# Patient Record
Sex: FEMALE | Race: ASIAN | Employment: STUDENT | URBAN - METROPOLITAN AREA
[De-identification: names, ages, dates, MRNs, and addresses within clinical notes are randomized per-mention and may not be internally consistent; named-entity substitution may affect disease eponyms.]

---
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
## 2018-03-20 ENCOUNTER — OFFICE VISIT (OUTPATIENT)
Dept: FAMILY MEDICINE | Facility: CLINIC | Age: 18
End: 2018-03-20
Payer: COMMERCIAL

## 2018-03-20 VITALS
BODY MASS INDEX: 16.73 KG/M2 | HEIGHT: 64 IN | WEIGHT: 98 LBS | DIASTOLIC BLOOD PRESSURE: 75 MMHG | HEART RATE: 76 BPM | SYSTOLIC BLOOD PRESSURE: 117 MMHG

## 2018-03-20 DIAGNOSIS — S06.0X0A CONCUSSION WITHOUT LOSS OF CONSCIOUSNESS, INITIAL ENCOUNTER: Primary | ICD-10-CM

## 2018-03-20 NOTE — MR AVS SNAPSHOT
"              After Visit Summary   3/20/2018    Fred Odom    MRN: [id number]           Patient Information     Date Of Birth          2000        Visit Information        Provider Department      3/20/2018 11:30 AM Radha Gorman MD [facility]        Today's Diagnoses     Concussion without loss of consciousness, initial encounter    -  1       Follow-ups after your visit        Who to contact     Please call your clinic at [phone] to:    Ask questions about your health    Make or cancel appointments    Discuss your medicines    Learn about your test results    Speak to your doctor            Additional Information About Your Visit        MyChart Information     Pixeon is an electronic gateway that provides easy, online access to your medical records. With Pixeon, you can request a clinic appointment, read your test results, renew a prescription or communicate with your care team.     To sign up for Sensiotect visit the website at [email or web address]   You will be asked to enter the access code listed below, as well as some personal information. Please follow the directions to create your username and password.     Your access code is: [email or web address]  Expires: 2018 12:00 PM     Your access code will  in 90 days. If you need help or a new code, please contact your [facility] or call [phone] for assistance.        Care EveryWhere ID     This is your Care EveryWhere ID. This could be used by other organizations to access your [place] medical records  [id number]        Your Vitals Were     Pulse Height Last Period Breastfeeding? BMI (Body Mass Index)       76 1.626 m (5' 4\") 2018 (Approximate) No 16.82 kg/m2        Blood Pressure from Last 3 Encounters:   18 117/75   17 117/79   16 102/68    Weight from Last 3 Encounters:   18 44.5 kg (98 lb) (2 %)*   17 44 kg (97 lb) (2 %)* "   09/21/16 43.1 kg (95 lb) (1 %)*     * Growth percentiles are based on [facility] 2-20 Years data.              Today, you had the following     No orders found for display       Primary Care Provider    None Specified       No primary provider on file.        Equal Access to Services     ROXANNE GAMEZ : Hadii aad ku hadkseniao Sohermesali, wadarinda luqadaha, qamichelleta kaalmada adezaina, shadi lien maria ezohreh bradford keikoagustina lopez . So [facility] [phone].    ATENCIÓN: Si habla español, tiene a lynn disposición servicios gratuitos de asistencia lingüística. Llame al [phone].    We comply with applicable federal civil rights laws and [place] laws. We do not discriminate on the basis of race, color, national origin, age, disability, sex, sexual orientation, or gender identity.            Thank you!     Thank you for choosing [facility]  for your care. Our goal is always to provide you with excellent care. Hearing back from our patients is one way we can continue to improve our services. Please take a few minutes to complete the written survey that you may receive in the mail after your visit with us. Thank you!             Your Updated Medication List - Protect others around you: Learn how to safely use, store and throw away your medicines at www.disposemymeds.org.      Notice  As of 3/20/2018 12:00 PM    You have not been prescribed any medications.

## 2018-03-20 NOTE — PROGRESS NOTES
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 She just presented to her ATC today with her symptoms.  She has also had a cold which is improving so she thought it was just that causing her symptoms.     Mechanism of injury: Dropped by her partner and hit her head on a spring floor.    Immediate Symptoms:  headache, excessive sleepiness, behavior changes, poor concentration, neck pain and blurred vision.  Neck pain has resolved.   Symptoms at this visit:   Headache: Daily HAs worse with exertion. Feeling tired. Hard to focus on homework.  Studying causes HAs.  Using ibuprofen.       SCAT 3 today:  11 symptoms and total score of 20  SAC baseline 28 and today was 25  ZAK:  2 errors today and 3 at baseline    Past pertinent history: Migraines: no     Depression: no     Anxiety: no     Learning disability: no     ADHD: no     Past History of concussions: No    O: NAD    /75  Pulse 76  Ht 1.626 m (5' 4\")  Wt 44.5 kg (98 lb)  LMP 02/21/2018 (Approximate)  Breastfeeding? No  BMI 16.82 kg/m2        EXAM:  GENERAL APPEARANCE: healthy, alert and no distress     NEURO: Normal strength and tone, mentation intact and speech normal  PSYCH:  mentation appears normal and affect normal/bright    HEENT:    NECK:  supple, non-tender, FULL ROM    Neurologic:  TASHIA:Yes  EOMI:Yes  Nystagmus: No  Balance Testing: ZAK testing done, see above.   Painful Eye movements: No  Convergence Testing: tip of nose  Neuro vestibular testing: Head Still eyes move side to side: no nystagmus, no headache, no dizziness and no nausea  Head still eyes move up and down: no nystagmus, no headache, no dizziness and no nausea  Eyes fixed head moves side to side: headache and dizziness  Eyes fixed, head moves up and down: headache and dizziness           ASSESSMENT/PLAN  Concussion with a delayed presentation to the medical staff.    RTL Level " 3    Short periods of studying.  No cheerleading activities.  Ok to try light exercise bike after a couple of days. We discussed in depth what patient should avoid.     RTC in 2 weeks    The patient was managed according to the  Athletic Medicine Concussion Management Plan.      Sabina Hoff ATC was present for the entire appointment.     Saúl Tomlin MD, Sports Medicine Fellow saw and examined the patient as well.     Radha Gorman MD, CAQ, FACSM, CCD  [facility]  Sports Medicine and Bone Health  Team Physician;  Athletics

## 2018-03-20 NOTE — LETTER
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 She just presented to her ATC today with her symptoms.  She has also had a cold which is improving so she thought it was just that causing her symptoms.     Mechanism of injury: Dropped by her partner and hit her head on a spring floor.    Immediate Symptoms:  headache, excessive sleepiness, behavior changes, poor concentration, neck pain and blurred vision.  Neck pain has resolved.   Symptoms at this visit:   Headache: Daily HAs worse with exertion. Feeling tired. Hard to focus on homework.  Studying causes HAs.  Using ibuprofen.       SCAT 3 today:  11 symptoms and total score of 20  SAC baseline 28 and today was 25  ZAK:  2 errors today and 3 at baseline    Past pertinent history: Migraines: no     Depression: no     Anxiety: no     Learning disability: no     ADHD: no     Past History of concussions: No    O: NAD    /75  Pulse 76  Ht 1.626 m (5' 4\")  Wt 44.5 kg (98 lb)  LMP 02/21/2018 (Approximate)  Breastfeeding? No  BMI 16.82 kg/m2        EXAM:  GENERAL APPEARANCE: healthy, alert and no distress     NEURO: Normal strength and tone, mentation intact and speech normal  PSYCH:  mentation appears normal and affect normal/bright    HEENT:    NECK:  supple, non-tender, FULL ROM    Neurologic:  TASHIA:Yes  EOMI:Yes  Nystagmus: No  Balance Testing: ZAK testing done, see above.   Painful Eye movements: No  Convergence Testing: tip of nose  Neuro vestibular testing: Head Still eyes move side to side: no nystagmus, no headache, no dizziness and no nausea  Head still eyes move up and down: no nystagmus, no headache, no dizziness and no nausea  Eyes fixed head moves side to side: headache and dizziness  Eyes fixed, head moves up and down: headache and " dizziness           ASSESSMENT/PLAN  Concussion with a delayed presentation to the medical staff.    RTL Level 3    Short periods of studying.  No cheerleading activities.  Ok to try light exercise bike after a couple of days. We discussed in depth what patient should avoid.     RTC in 2 weeks    The patient was managed according to the  Athletic Medicine Concussion Management Plan.      Sabina Hoff ATC was present for the entire appointment.     Saúl Tomlin MD, Sports Medicine Fellow saw and examined the patient as well.     Radha Gorman MD, CAQ, FACSM, CCD  [facility]  Sports Medicine and Bone Health  Team Physician;  Athletics      Radha Gorman MD

## 2018-03-20 NOTE — LETTER
Date:March 21, 2018      Patient was self referred, no letter generated. Do not send.        [facility] Health Information

## 2018-04-09 ENCOUNTER — OFFICE VISIT (OUTPATIENT)
Dept: FAMILY MEDICINE | Facility: CLINIC | Age: 18
End: 2018-04-09
Payer: COMMERCIAL

## 2018-04-09 VITALS
SYSTOLIC BLOOD PRESSURE: 113 MMHG | DIASTOLIC BLOOD PRESSURE: 78 MMHG | HEIGHT: 64 IN | WEIGHT: 99 LBS | HEART RATE: 101 BPM | BODY MASS INDEX: 16.9 KG/M2

## 2018-04-09 DIAGNOSIS — S06.0X0D CONCUSSION WITHOUT LOSS OF CONSCIOUSNESS, SUBSEQUENT ENCOUNTER: Primary | ICD-10-CM

## 2018-04-09 NOTE — PROGRESS NOTES
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 Delayed presentation for the fist three weeks.   Feels approx 50% better.    Hard to fall asleep but then sleeps normally. Sleeping 7-8 hours per day.   HAs L frontal and posterior.   Sensitive to light.   Dizzy when trying to focus  RTL 3  Comprehension is not as good as normal. She has caught up in class.   Irritated easier.     Past pertinent history: Migraines: no     Depression: no     Anxiety: no     Learning disability: no     ADHD: no     Past History of concussions: No    O: NAD  /78  Pulse 101  Ht 1.626 m (5' 4\")  Wt 44.9 kg (99 lb)  LMP 03/26/2018 (Approximate)  BMI 16.99 kg/m2      EXAM:  GENERAL APPEARANCE: healthy, alert and no distress   Looks brighter.    NEURO: Normal strength and tone, mentation intact and speech normal  PSYCH:  mentation appears normal and affect normal/bright    Neurologic:  TASHIA:Yes  EOMI:Yes  Nystagmus: No  Convergence Testing: tip of nose with repeated trials.   Neuro vestibular testing: Head Still eyes move side to side: no nystagmus, no headache, no dizziness and no nausea  Head still eyes move up and down: no nystagmus, no headache, no dizziness and no nausea  Eyes fixed head moves side to side: mild headache   Eyes fixed, head moves up and down: mild headache    Thumb tracking:  Mild HA and dizziness        ASSESSMENT/PLAN  Concussion with a delayed presentation to the medical staff.    Advance RTL to  Level 4     No cheerleading activities.  Ok to try light exercise bike and see how she feels as it may help her symptoms.     RTC in 2 weeks    The patient was managed according to the  Athletic Medicine Concussion Management Plan.      Sabina Hoff ATC was present for the entire appointment.     Saúl Holley MD, Sports Medicine Fellow saw and examined the patient as well.     Radha Gorman MD, CAQ, " MYRIAM, MIKE  [facility]  Sports Medicine and Bone Health  Team Physician;  Athletics

## 2018-04-09 NOTE — LETTER
Date:April 10, 2018      Patient was self referred, no letter generated. Do not send.        [facility] Health Information

## 2018-04-09 NOTE — LETTER
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 Delayed presentation for the fist three weeks.   Feels approx 50% better.    Hard to fall asleep but then sleeps normally. Sleeping 7-8 hours per day.   HAs L frontal and posterior.   Sensitive to light.   Dizzy when trying to focus  RTL 3  Comprehension is not as good as normal. She has caught up in class.   Irritated easier.     Past pertinent history: Migraines: no     Depression: no     Anxiety: no     Learning disability: no     ADHD: no     Past History of concussions: No    O: NAD  /78  Pulse 101  Ht 1.626 m (5' 4\")  Wt 44.9 kg (99 lb)  LMP 03/26/2018 (Approximate)  BMI 16.99 kg/m2      EXAM:  GENERAL APPEARANCE: healthy, alert and no distress   Looks brighter.    NEURO: Normal strength and tone, mentation intact and speech normal  PSYCH:  mentation appears normal and affect normal/bright    Neurologic:  TASHIA:Yes  EOMI:Yes  Nystagmus: No  Convergence Testing: tip of nose with repeated trials.   Neuro vestibular testing: Head Still eyes move side to side: no nystagmus, no headache, no dizziness and no nausea  Head still eyes move up and down: no nystagmus, no headache, no dizziness and no nausea  Eyes fixed head moves side to side: mild headache   Eyes fixed, head moves up and down: mild headache    Thumb tracking:  Mild HA and dizziness        ASSESSMENT/PLAN  Concussion with a delayed presentation to the medical staff.    Advance RTL to  Level 4     No cheerleading activities.  Ok to try light exercise bike and see how she feels as it may help her symptoms.     RTC in 2 weeks    The patient was managed according to the  Athletic Medicine Concussion Management Plan.      Sabina Hoff ATC was present for the entire appointment.     Saúl Holley MD, Sports Medicine " Fellow saw and examined the patient as well.     Radha Gorman MD, CAQ, FACSM, CCD  [facility]  Sports Medicine and Bone Health  Team Physician;  Athletics    Radha Gorman MD

## 2018-04-09 NOTE — MR AVS SNAPSHOT
"              After Visit Summary   2018    Fred Odom    MRN: [id number]           Patient Information     Date Of Birth          2000        Visit Information        Provider Department      2018 10:30 AM aRdha Gorman MD [facility]        Today's Diagnoses     Concussion without loss of consciousness, subsequent encounter    -  1       Follow-ups after your visit        Who to contact     Please call your clinic at [phone] to:    Ask questions about your health    Make or cancel appointments    Discuss your medicines    Learn about your test results    Speak to your doctor            Additional Information About Your Visit        MyChart Information     FraudMetrix is an electronic gateway that provides easy, online access to your medical records. With FraudMetrix, you can request a clinic appointment, read your test results, renew a prescription or communicate with your care team.     To sign up for Luv Rinkt visit the website at [email or web address]   You will be asked to enter the access code listed below, as well as some personal information. Please follow the directions to create your username and password.     Your access code is: [email or web address]  Expires: 2018 12:00 PM     Your access code will  in 90 days. If you need help or a new code, please contact your [facility] or call [phone] for assistance.        Care EveryWhere ID     This is your Care EveryWhere ID. This could be used by other organizations to access your [place] medical records  [id number]        Your Vitals Were     Pulse Height Last Period BMI (Body Mass Index)          101 1.626 m (5' 4\") 2018 (Approximate) 16.99 kg/m2         Blood Pressure from Last 3 Encounters:   18 113/78   18 117/75   17 117/79    Weight from Last 3 Encounters:   18 44.9 kg (99 lb) (3 %)*   18 44.5 kg (98 lb) (2 %)*   17 44 " kg (97 lb) (2 %)*     * Growth percentiles are based on [facility] 2-20 Years data.              Today, you had the following     No orders found for display       Primary Care Provider    None Specified       No primary provider on file.        Equal Access to Services     UGOBIMAL VERA : Hadii abelino huggins joshuao Zhanna, wadarinda luqadaha, mariota kaalmada pablo, shadi negro robertomary weiner laKamilacheyanne . So [facility] [phone].    ATENCIÓN: Si habla español, tiene a lynn disposición servicios gratuitos de asistencia lingüística. Llame al [phone].    We comply with applicable federal civil rights laws and [place] laws. We do not discriminate on the basis of race, color, national origin, age, disability, sex, sexual orientation, or gender identity.            Thank you!     Thank you for choosing [facility]  for your care. Our goal is always to provide you with excellent care. Hearing back from our patients is one way we can continue to improve our services. Please take a few minutes to complete the written survey that you may receive in the mail after your visit with us. Thank you!             Your Updated Medication List - Protect others around you: Learn how to safely use, store and throw away your medicines at www.disposemymeds.org.      Notice  As of 4/9/2018 11:04 AM    You have not been prescribed any medications.

## 2018-04-23 ENCOUNTER — OFFICE VISIT (OUTPATIENT)
Dept: FAMILY MEDICINE | Facility: CLINIC | Age: 18
End: 2018-04-23
Payer: COMMERCIAL

## 2018-04-23 VITALS
BODY MASS INDEX: 16.9 KG/M2 | HEART RATE: 79 BPM | HEIGHT: 64 IN | SYSTOLIC BLOOD PRESSURE: 97 MMHG | DIASTOLIC BLOOD PRESSURE: 70 MMHG | WEIGHT: 99 LBS

## 2018-04-23 DIAGNOSIS — S06.0X0D CONCUSSION WITHOUT LOSS OF CONSCIOUSNESS, SUBSEQUENT ENCOUNTER: Primary | ICD-10-CM

## 2018-04-23 NOTE — MR AVS SNAPSHOT
"              After Visit Summary   2018    Fred Odom    MRN: [id number]           Patient Information     Date Of Birth          2000        Visit Information        Provider Department      2018 10:45 AM Radha Gorman MD [facility]        Today's Diagnoses     Concussion without loss of consciousness, subsequent encounter    -  1       Follow-ups after your visit        Who to contact     Please call your clinic at [phone] to:    Ask questions about your health    Make or cancel appointments    Discuss your medicines    Learn about your test results    Speak to your doctor            Additional Information About Your Visit        MyChart Information     Central Logic is an electronic gateway that provides easy, online access to your medical records. With Central Logic, you can request a clinic appointment, read your test results, renew a prescription or communicate with your care team.     To sign up for [email or web address] visit the website at [email or web address]   You will be asked to enter the access code listed below, as well as some personal information. Please follow the directions to create your username and password.     Your access code is: [email or web address]  Expires: 2018 12:00 PM     Your access code will  in 90 days. If you need help or a new code, please contact your [facility] or call [phone] for assistance.        Care EveryWhere ID     This is your Care EveryWhere ID. This could be used by other organizations to access your [place] medical records  [id number]        Your Vitals Were     Pulse Height Last Period BMI (Body Mass Index)          79 5' 4\" (1.626 m) 2018 (Approximate) 16.99 kg/m2         Blood Pressure from Last 3 Encounters:   18 97/70   18 113/78   18 117/75    Weight from Last 3 Encounters:   18 99 lb (44.9 kg) (3 %)*   18 99 lb (44.9 kg) (3 %)*   18 98 " lb (44.5 kg) (2 %)*     * Growth percentiles are based on [facility] 2-20 Years data.              Today, you had the following     No orders found for display       Primary Care Provider    None Specified       No primary provider on file.        Equal Access to Services     ROXANNE GAMEZ : Hadii aad ku hadkseniao Sohermesali, wadarinda luqadaha, qamichelleta kaalmada adezaina, shadi mandyin hayaazohreh mejiamary weiner jessica moreno. So [facility] [phone].    ATENCIÓN: Si habla español, tiene a lynn disposición servicios gratuitos de asistencia lingüística. Llame al [phone].    We comply with applicable federal civil rights laws and [place] laws. We do not discriminate on the basis of race, color, national origin, age, disability, sex, sexual orientation, or gender identity.            Thank you!     Thank you for choosing [facility]LET[facility] CLIN[facility]  for your care. Our goal is always to provide you with excellent care. Hearing back from our patients is one way we can continue to improve our services. Please take a few minutes to complete the written survey that you may receive in the mail after your visit with us. Thank you!             Your Updated Medication List - Protect others around you: Learn how to safely use, store and throw away your medicines at www.disposemymeds.org.      Notice  As of 4/23/2018 11:29 AM    You have not been prescribed any medications.

## 2018-04-23 NOTE — LETTER
Date:April 24, 2018      Patient was self referred, no letter generated. Do not send.        [facility] Health Information

## 2018-04-23 NOTE — LETTER
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 Delayed presentation for the fist three weeks.   Feels approx 85% better.    Sleeping well now without any problems.   No HAs but mild dizziness with longer walking or longer studying.   RTL 4  Mood is better.  Dizziness is \"unsteady\" and not vertigo (spinning)    O: NAD  BP 97/70  Pulse 79  Ht 5' 4\" (1.626 m)  Wt 99 lb (44.9 kg)  LMP 03/26/2018 (Approximate)  BMI 16.99 kg/m2      EXAM:  GENERAL APPEARANCE: healthy, alert and no distress   Looks happy   NEURO: Normal strength and tone, mentation intact and speech normal  PSYCH:  mentation appears normal and affect normal/bright    Neurologic:  Eyes fixed head moves side to side: mild headache but less than her last visit. No dizziness  Eyes fixed, head moves up and down: mild headache but less than her last visit.  No dizziness  Thumb tracking:  Mild HA and dizziness but less than last visit.         ASSESSMENT/PLAN  Concussion with a delayed presentation to the medical staff: making good progress  Neurovestibular dysfunction  RTL to  Level 4     .  Ok to try increasing intensity of exercise bike workouts this week.  If that goes well, trial of cheering without jumping or tumbling next week.   RTC in 2 weeks    The patient was managed according to the  Athletic Medicine Concussion Management Plan.      Sabina Hoff ATC was present for the entire appointment.       Radha Gorman MD, CAQ, FACSM, CCD  [facility]  Sports Medicine and Bone Health  Team Physician;  Athletics    Radha Gorman MD    "

## 2018-05-08 ENCOUNTER — OFFICE VISIT (OUTPATIENT)
Dept: FAMILY MEDICINE | Facility: CLINIC | Age: 18
End: 2018-05-08
Payer: COMMERCIAL

## 2018-05-08 VITALS
HEIGHT: 64 IN | BODY MASS INDEX: 17.07 KG/M2 | HEART RATE: 124 BPM | SYSTOLIC BLOOD PRESSURE: 115 MMHG | DIASTOLIC BLOOD PRESSURE: 82 MMHG | WEIGHT: 100 LBS

## 2018-05-08 DIAGNOSIS — S06.0X0D CONCUSSION WITHOUT LOSS OF CONSCIOUSNESS, SUBSEQUENT ENCOUNTER: Primary | ICD-10-CM

## 2018-05-08 NOTE — LETTER
Date:May 9, 2018      Patient was self referred, no letter generated. Do not send.        [facility] Physicians Health Information

## 2018-05-08 NOTE — MR AVS SNAPSHOT
"              After Visit Summary   2018    Fred Odom    MRN: [id number]           Patient Information     Date Of Birth          2000        Visit Information        Provider Department      2018 2:15 PM Radha Gorman MD [facility]        Today's Diagnoses     Concussion without loss of consciousness, subsequent encounter    -  1       Follow-ups after your visit        Who to contact     Please call your clinic at [phone] to:    Ask questions about your health    Make or cancel appointments    Discuss your medicines    Learn about your test results    Speak to your doctor            Additional Information About Your Visit        MyChart Information     mcTEL is an electronic gateway that provides easy, online access to your medical records. With mcTEL, you can request a clinic appointment, read your test results, renew a prescription or communicate with your care team.     To sign up for mcTEL visit the website at [email or web address]   You will be asked to enter the access code listed below, as well as some personal information. Please follow the directions to create your username and password.     Your access code is: [email or web address]  Expires: 2018 12:00 PM     Your access code will  in 90 days. If you need help or a new code, please contact your [facility] or call [phone] for assistance.        Care EveryWhere ID     This is your Care EveryWhere ID. This could be used by other organizations to access your [place] medical records  [id number]        Your Vitals Were     Pulse Height Last Period BMI (Body Mass Index)          124 1.626 m (5' 4\") 2018 (Exact Date) 17.16 kg/m2         Blood Pressure from Last 3 Encounters:   18 115/82   18 97/70   18 113/78    Weight from Last 3 Encounters:   18 45.4 kg (100 lb)   18 44.9 kg (99 lb) (3 %)*   18 44.9 kg (99 " lb) (3 %)*     * Growth percentiles are based on [facility] 2-20 Years data.              Today, you had the following     No orders found for display       Primary Care Provider    None Specified       No primary provider on file.        Equal Access to Services     ROXANNE GAMEZ : Hadii aad ku hadkseniatico Shawandahermesali, eli karinfunmilayo, mahesh kaevie shah, shadi sandersonzohreh mejiamary adenagustina moreno. So [facility] [phone].    ATENCIÓN: Si habla español, tiene a lnyn disposición servicios gratuitos de asistencia lingüística. Llame al [phone].    We comply with applicable federal civil rights laws and [place] laws. We do not discriminate on the basis of race, color, national origin, age, disability, sex, sexual orientation, or gender identity.            Thank you!     Thank you for choosing [facility]TH[facility]  for your care. Our goal is always to provide you with excellent care. Hearing back from our patients is one way we can continue to improve our services. Please take a few minutes to complete the written survey that you may receive in the mail after your visit with us. Thank you!             Your Updated Medication List - Protect others around you: Learn how to safely use, store and throw away your medicines at www.disposemymeds.org.      Notice  As of 5/8/2018  3:06 PM    You have not been prescribed any medications.

## 2018-05-08 NOTE — PROGRESS NOTES
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 Delayed presentation for the fist three weeks.   Feels approx 95% better.    Sleeping well without compliants  HA with some unsteadiness at times with prolonged studying and working out.   RTL 4  Mood: good  Dizziness is \"unsteady\" and not vertigo (spinning)    O: NAD  /82  Pulse 124  Ht 1.626 m (5' 4\")  Wt 45.4 kg (100 lb)  LMP 04/23/2018 (Exact Date)  BMI 17.16 kg/m2      EXAM:  GENERAL APPEARANCE: healthy, alert and no distress   Looks happy   NEURO: Normal strength and tone, mentation intact and speech normal  PSYCH:  mentation appears normal and affect normal/bright    Neurologic:  Thumb tracking:  Minor frontal HA and dizziness but less than last visit.         ASSESSMENT/PLAN  Concussion with a delayed presentation to the medical staff: making good progress  Neurovestibular dysfunction  RTL to  Level 5 once her last final is done.      Ok to try increasing intensity of exercise bike workouts and adding light jogging early next week. If that goes well, trial of cheering without jumping or tumbling could be the next step.  RTC on May 17 since they have practice that day for a week.  The next one week practice will be in late June.        The patient was managed according to the  Athletic Medicine Concussion Management Plan.      Sabina oHff ATC was present for the entire appointment.       Radha Gorman MD, CAQ, FACSM, CCD  [facility]  Sports Medicine and Bone Health  Team Physician;  Athletics  "

## 2018-05-08 NOTE — LETTER
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 Delayed presentation for the fist three weeks.   Feels approx 95% better.    Sleeping well without compliants  HA with some unsteadiness at times with prolonged studying and working out.   RTL 4  Mood: good  Dizziness is \"unsteady\" and not vertigo (spinning)    O: NAD  /82  Pulse 124  Ht 1.626 m (5' 4\")  Wt 45.4 kg (100 lb)  LMP 04/23/2018 (Exact Date)  BMI 17.16 kg/m2      EXAM:  GENERAL APPEARANCE: healthy, alert and no distress   Looks happy   NEURO: Normal strength and tone, mentation intact and speech normal  PSYCH:  mentation appears normal and affect normal/bright    Neurologic:  Thumb tracking:  Minor frontal HA and dizziness but less than last visit.         ASSESSMENT/PLAN  Concussion with a delayed presentation to the medical staff: making good progress  Neurovestibular dysfunction  RTL to  Level 5 once her last final is done.      Ok to try increasing intensity of exercise bike workouts and adding light jogging early next week. If that goes well, trial of cheering without jumping or tumbling could be the next step.  RTC on May 17 since they have practice that day for a week.  The next one week practice will be in late June.        The patient was managed according to the  Athletic Medicine Concussion Management Plan.      Sabina Hoff ATC was present for the entire appointment.       Radha Gorman MD, CAQ, FACSM, CCD  [facility]  Sports Medicine and Bone Health  Team Physician;  Athletics    Radha Gorman MD    "

## 2018-05-17 ENCOUNTER — OFFICE VISIT (OUTPATIENT)
Dept: FAMILY MEDICINE | Facility: CLINIC | Age: 18
End: 2018-05-17
Payer: COMMERCIAL

## 2018-05-17 VITALS
WEIGHT: 99 LBS | HEIGHT: 64 IN | SYSTOLIC BLOOD PRESSURE: 129 MMHG | DIASTOLIC BLOOD PRESSURE: 82 MMHG | BODY MASS INDEX: 16.9 KG/M2 | HEART RATE: 121 BPM

## 2018-05-17 DIAGNOSIS — S06.0X0D CONCUSSION WITHOUT LOSS OF CONSCIOUSNESS, SUBSEQUENT ENCOUNTER: Primary | ICD-10-CM

## 2018-05-17 NOTE — LETTER
Date:June 8, 2018      Patient was self referred, no letter generated. Do not send.        [facility] Physicians Health Information

## 2018-05-17 NOTE — PROGRESS NOTES
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 Delayed presentation for the first three weeks. States she continues to improve and is currently asymptomatic.  Currently RTL level 5. Dizziness and headaches have improved. However, feels spinning or tumbling has potential to elicit these symptoms.  Has been increasing activity, is currently planning on doing a trial of practice today as previously discussed at last clinic visit.     O: NAD  /82  Pulse 124  Ht 1.626 m (5' 4\")  Wt 45.4 kg (100 lb)  LMP 04/23/2018 (Exact Date)  BMI 17.16 kg/m2        EXAM:  GENERAL APPEARANCE: healthy, alert and no distress   Looks happy   NEURO: Normal strength and tone, mentation intact and speech normal  PSYCH:  mentation appears normal and affect normal/bright     Neurologic:  Convergence: Tip of nose  Thumb tracking: asymptomatic (improved from prior exam)                               ASSESSMENT/PLAN  Concussion with a delayed presentation to the medical staff  Overall improving  Neurovestibular dysfunction  RTL level 5  Discussed she is ok to do trial of cheer without tumbling, flying, or spins.   May continue to progress activity with elliptical today  If tolerates elliptical, will progress to light jogging  If recurrence of symptoms, counseled at length to report to Sabina MERIDA  Recommend RTC in 1-2 weeks to discuss final clearance if continued improvement.  Patient and Sabina Gibson endorsed understanding and agreement with the above plan    Patient seen and discussed with MD Edu Morillo MD  Primary Care Sports Medicine Fellow  "

## 2018-05-17 NOTE — LETTER
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 Delayed presentation for the first three weeks. States she continues to improve and is currently asymptomatic.  Currently RTL level 5. Dizziness and headaches have improved. However, feels spinning or tumbling has potential to elicit these symptoms.  Has been increasing activity, is currently planning on doing a trial of practice today as previously discussed at last clinic visit.     O: NAD  /82  Pulse 124  Ht 1.626 m (5' 4\")  Wt 45.4 kg (100 lb)  LMP 04/23/2018 (Exact Date)  BMI 17.16 kg/m2        EXAM:  GENERAL APPEARANCE: healthy, alert and no distress   Looks happy   NEURO: Normal strength and tone, mentation intact and speech normal  PSYCH:  mentation appears normal and affect normal/bright     Neurologic:  Convergence: Tip of nose  Thumb tracking: asymptomatic (improved from prior exam)                               ASSESSMENT/PLAN  Concussion with a delayed presentation to the medical staff  Overall improving  Neurovestibular dysfunction  RTL level 5  Discussed she is ok to do trial of cheer without tumbling, flying, or spins.   May continue to progress activity with elliptical today  If tolerates elliptical, will progress to light jogging  If recurrence of symptoms, counseled at length to report to Sabina MERIDA  Recommend RTC in 1-2 weeks to discuss final clearance if continued improvement.  Patient and cheer Sabina MERIDA endorsed understanding and agreement with the above plan    Patient seen and discussed with MD Edu Morillo MD  Primary Care Sports Medicine Fellow    During today's visit, I was present in the room to review the history and the pertinent parts of the exam. I agree with the above assessment and plan as documented by the fellow.  Supervising physician: Scar LEAL" Nithya  [facility]      Scar Barriga MD

## 2018-05-17 NOTE — MR AVS SNAPSHOT
"              After Visit Summary   2018    Fred Odom    MRN: [id number]           Patient Information     Date Of Birth          2000        Visit Information        Provider Department      2018 11:45 AM Scar Barriga MD [facility]        Today's Diagnoses     Concussion without loss of consciousness, subsequent encounter    -  1       Follow-ups after your visit        Who to contact     Please call your clinic at [phone] to:    Ask questions about your health    Make or cancel appointments    Discuss your medicines    Learn about your test results    Speak to your doctor            Additional Information About Your Visit        MyChart Information     [employer] is an electronic gateway that provides easy, online access to your medical records. With [employer], you can request a clinic appointment, read your test results, renew a prescription or communicate with your care team.     To sign up for [employer] visit the website at [email or web address]   You will be asked to enter the access code listed below, as well as some personal information. Please follow the directions to create your username and password.     Your access code is: [email or web address]  Expires: 2018 12:00 PM     Your access code will  in 90 days. If you need help or a new code, please contact your [facility] or call [phone] for assistance.        Care EveryWhere ID     This is your Care EveryWhere ID. This could be used by other organizations to access your [place] medical records  [id number]        Your Vitals Were     Pulse Height Last Period BMI (Body Mass Index)          121 1.626 m (5' 4\") 2018 (Exact Date) 16.99 kg/m2         Blood Pressure from Last 3 Encounters:   18 129/82   18 115/82   18 97/70    Weight from Last 3 Encounters:   18 44.9 kg (99 lb)   18 45.4 kg (100 lb)   18 44.9 kg (99 lb) (3 %)*     * " Growth percentiles are based on [facility] 2-20 Years data.              Today, you had the following     No orders found for display       Primary Care Provider    None Specified       No primary provider on file.        Equal Access to Services     ROXANNE GAMEZ : Guillaume Chao, eli guerra, traceygigi stilesconcepciondeena mejiazaina, shadi mandyin hayaazohreh mejiamary weiner jessica moreno. So [facility] [phone].    ATENCIÓN: Si habla español, tiene a lynn disposición servicios gratuitos de asistencia lingüística. Llame al [phone].    We comply with applicable federal civil rights laws and [place] laws. We do not discriminate on the basis of race, color, national origin, age, disability, sex, sexual orientation, or gender identity.            Thank you!     Thank you for choosing [facility] ATHLETIC [facility]  for your care. Our goal is always to provide you with excellent care. Hearing back from our patients is one way we can continue to improve our services. Please take a few minutes to complete the written survey that you may receive in the mail after your visit with us. Thank you!             Your Updated Medication List - Protect others around you: Learn how to safely use, store and throw away your medicines at www.disposemymeds.org.      Notice  As of 5/17/2018 11:59 PM    You have not been prescribed any medications.

## 2018-06-12 ENCOUNTER — OFFICE VISIT (OUTPATIENT)
Dept: FAMILY MEDICINE | Facility: CLINIC | Age: 18
End: 2018-06-12
Payer: COMMERCIAL

## 2018-06-12 DIAGNOSIS — S06.0X0D CONCUSSION WITHOUT LOSS OF CONSCIOUSNESS, SUBSEQUENT ENCOUNTER: Primary | ICD-10-CM

## 2018-06-12 NOTE — PROGRESS NOTES
Attending Note:   I have personally examined this patient and have reviewed the clinical presentation and progress note with the fellow. I agree with the treatment plan as outlined. The plan was formulated with the fellow on the day of the patient's visit.   Radha Gorman MD, CAQ, CCD  [facility]  Sports Medicine and Bone Health

## 2018-06-12 NOTE — PROGRESS NOTES
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 Delayed presentation for the first three weeks. States she continues to improve.  Currently RTL level 5. Dizziness and headaches have resolved essentially. Slightly dizzy with a mild frontal HA with inverted positions. Dizzy during inverted positions some times. Not doing back handsprings. Some HA increase with running but resolves later.  Running and doing non-tumbling cheering without symptoms. Started inverted position about two weeks ago.     O: NAD  /82  Pulse 124  Ht 1.626 m (5' 4\")  Wt 45.4 kg (100 lb)  LMP 04/23/2018 (Exact Date)  BMI 17.16 kg/m2        EXAM:  GENERAL APPEARANCE: healthy, alert and no distress   Looks happy   NEURO: Normal strength and tone, mentation intact and speech normal  PSYCH:  mentation appears normal and affect normal/bright     ASSESSMENT/PLAN  Concussion with a delayed presentation to the medical staff  Overall improving  Neurovestibular dysfunction  RTL level 5  Discussed she is ok to do trial of cheer without tumbling, flying, or spins.  Do not advance yet.    Recommend RTC in 1-2 weeks to discuss advancing to back handsprings, tucks and stunting.   Patient and chejoy ATAsif endorsed understanding and agreement with the above plan    Patient seen and discussed with Dr. Sherif Tomlin MD  Primary Care Sports Medicine Fellow  "

## 2018-06-12 NOTE — LETTER
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 Delayed presentation for the first three weeks. States she continues to improve.  Currently RTL level 5. Dizziness and headaches have resolved essentially. Slightly dizzy with a mild frontal HA with inverted positions. Dizzy during inverted positions some times. Not doing back handsprings. Some HA increase with running but resolves later.  Running and doing non-tumbling cheering without symptoms. Started inverted position about two weeks ago.     O: NAD  /82  Pulse 124  Ht 1.626 m (5' 4\")  Wt 45.4 kg (100 lb)  LMP 04/23/2018 (Exact Date)  BMI 17.16 kg/m2        EXAM:  GENERAL APPEARANCE: healthy, alert and no distress   Looks happy   NEURO: Normal strength and tone, mentation intact and speech normal  PSYCH:  mentation appears normal and affect normal/bright     ASSESSMENT/PLAN  Concussion with a delayed presentation to the medical staff  Overall improving  Neurovestibular dysfunction  RTL level 5  Discussed she is ok to do trial of cheer without tumbling, flying, or spins.  Do not advance yet.    Recommend RTC in 1-2 weeks to discuss advancing to back handsprings, tucks and stunting.   Patient and cheAsif Herrera endorsed understanding and agreement with the above plan    Patient seen and discussed with Dr. Sherif Tomlin MD  Primary Care Sports Medicine Fellow    Attending Note:   I have personally examined this patient and have reviewed the clinical presentation and progress note with the fellow. I agree with the treatment plan as outlined. The plan was formulated with the fellow on the day of the patient's visit.   Radha Gorman MD, CAQ, CCD  [facility]  Sports Medicine and Bone Health    Radha Gorman MD    "

## 2018-06-12 NOTE — MR AVS SNAPSHOT
After Visit Summary   2018    Fred Odom    MRN: [id number]           Patient Information     Date Of Birth          2000        Visit Information        Provider Department      2018 9:30 AM Radha Gorman MD [facility]        Today's Diagnoses     Concussion without loss of consciousness, subsequent encounter    -  1       Follow-ups after your visit        Who to contact     Please call your clinic at [phone] to:    Ask questions about your health    Make or cancel appointments    Discuss your medicines    Learn about your test results    Speak to your doctor            Additional Information About Your Visit        MyChart Information     [employer] is an electronic gateway that provides easy, online access to your medical records. With [employer], you can request a clinic appointment, read your test results, renew a prescription or communicate with your care team.     To sign up for [employer] visit the website at [email or web address]   You will be asked to enter the access code listed below, as well as some personal information. Please follow the directions to create your username and password.     Your access code is: [email or web address]  Expires: 2018 12:00 PM     Your access code will  in 90 days. If you need help or a new code, please contact your [facility] or call [phone] for assistance.        Care EveryWhere ID     This is your Care EveryWhere ID. This could be used by other organizations to access your [place] medical records  [id number]         Blood Pressure from Last 3 Encounters:   18 129/82   18 115/82   18 97/70    Weight from Last 3 Encounters:   18 44.9 kg (99 lb)   18 45.4 kg (100 lb)   18 44.9 kg (99 lb) (3 %)*     * Growth percentiles are based on CDC 2-20 Years data.              Today, you had the following     No orders found for display        Primary Care Provider    None Specified       No primary provider on file.        Equal Access to Services     ROXANNE GAMEZ : Hadii abelino Chao, eli guerra, shadi smith. So [facility] [phone].    ATENCIÓN: Si habla español, tiene a lynn disposición servicios gratuitos de asistencia lingüística. Llame al [phone].    We comply with applicable federal civil rights laws and [place] laws. We do not discriminate on the basis of race, color, national origin, age, disability, sex, sexual orientation, or gender identity.            Thank you!     Thank you for choosing [facility]  for your care. Our goal is always to provide you with excellent care. Hearing back from our patients is one way we can continue to improve our services. Please take a few minutes to complete the written survey that you may receive in the mail after your visit with us. Thank you!             Your Updated Medication List - Protect others around you: Learn how to safely use, store and throw away your medicines at www.disposemymeds.org.      Notice  As of 6/12/2018  9:54 AM    You have not been prescribed any medications.

## 2018-06-12 NOTE — LETTER
Date:June 13, 2018      Patient was self referred, no letter generated. Do not send.        [facility] Physicians Health Information

## 2018-07-09 ENCOUNTER — OFFICE VISIT (OUTPATIENT)
Dept: FAMILY MEDICINE | Facility: CLINIC | Age: 18
End: 2018-07-09
Payer: COMMERCIAL

## 2018-07-09 VITALS
DIASTOLIC BLOOD PRESSURE: 73 MMHG | SYSTOLIC BLOOD PRESSURE: 110 MMHG | HEART RATE: 84 BPM | OXYGEN SATURATION: 99 % | BODY MASS INDEX: 18.88 KG/M2 | HEIGHT: 61 IN | WEIGHT: 100 LBS

## 2018-07-09 DIAGNOSIS — S06.0X0D CONCUSSION WITHOUT LOSS OF CONSCIOUSNESS, SUBSEQUENT ENCOUNTER: Primary | ICD-10-CM

## 2018-07-09 NOTE — PROGRESS NOTES
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  She had advanced to round offs, cartwheels, handstands and stunting and was doing well until...  -Sunday, June 24th was dropped while stunting. Landed on butt.  Did not hit head.  Head may have gotten banged while they were trying to catch her.  Headache, dizziness and neck pain return.  Same symptoms as previously experienced.  Symptoms improved a lot by JUne 28.  Symptoms continued to improved.  Last symptoms were July 1 or 2.  Resting. No exercise at all.  No classes this summer.  Sleeping:  Going well without symptoms.  More sleepy in the beginning.    SCAT3:  Was done last week Friday and she had symptoms but did well on the cognitive       O: NAD  /73  Pulse 84  Ht 1.549 m (5' 1\")  Wt 45.4 kg (100 lb)  LMP 06/21/2018  SpO2 99%  Breastfeeding? No  BMI 18.89 kg/m2      Neck;  NTTP  VOMS:    NPC:  tip of nose  Saccades:  Horizontal and Vertical: mild dizzy  VOR:  No symptoms  Thumb tracking; normal      Balance:  Single leg without and with eyes open and closed.   Rhomberg;  negative      FTN:  Negative    A:  Concussion  RTL Level 5:  but not in classes this summer.   Ok to start RTP progression.  RTC in one week    The patient was managed according to the  Athletic Medicine Concussion Management Protocol.        Monica Ch ATC was present for the entire appointment.        Brian Rivera MD, was present for the entire appt and examined the patient.        Radha Gorman MD, CAQ, FACSM, CCD  [facility]  Sports Medicine and Bone Health  Team Physician;  Athletics    "

## 2018-07-09 NOTE — LETTER
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  She had advanced to round offs, cartwheels, handstands and stunting and was doing well until...  -Sunday, June 24th was dropped while stunting. Landed on butt.  Did not hit head.  Head may have gotten banged while they were trying to catch her.  Headache, dizziness and neck pain return.  Same symptoms as previously experienced.  Symptoms improved a lot by JUne 28.  Symptoms continued to improved.  Last symptoms were July 1 or 2.  Resting. No exercise at all.  No classes this summer.  Sleeping:  Going well without symptoms.  More sleepy in the beginning.    SCAT3:  Was done last week Friday and she had symptoms but did well on the cognitive       O: NAD  /73  Pulse 84  Ht 1.549 m (5' 1\")  Wt 45.4 kg (100 lb)  LMP 06/21/2018  SpO2 99%  Breastfeeding? No  BMI 18.89 kg/m2      Neck;  NTTP  VOMS:    NPC:  tip of nose  Saccades:  Horizontal and Vertical: mild dizzy  VOR:  No symptoms  Thumb tracking; normal      Balance:  Single leg without and with eyes open and closed.   Rhomberg;  negative      FTN:  Negative    A:  Concussion  RTL Level 5:  but not in classes this summer.   Ok to start RTP progression.  RTC in one week    The patient was managed according to the  Athletic Medicine Concussion Management Protocol.        Monica Ch ATC was present for the entire appointment.        Brian Rivera MD, was present for the entire appt and examined the patient.        Radha Gorman MD, CAQ, FACSM, CCD  [facility]  Sports Medicine and Bone Health  Team Physician;  Athletics      Radha Gorman MD    "

## 2018-07-09 NOTE — LETTER
Date:July 10, 2018      Patient was self referred, no letter generated. Do not send.        [facility] Health Information

## 2018-07-09 NOTE — MR AVS SNAPSHOT
"              After Visit Summary   7/9/2018    Fred Odom    MRN: [id number]           Patient Information     Date Of Birth          2000        Visit Information        Provider Department      7/9/2018 9:45 AM Radha Gorman MD [facility]        Today's Diagnoses     Concussion without loss of consciousness, subsequent encounter    -  1       Follow-ups after your visit        Who to contact     Please call your clinic at [phone] to:    Ask questions about your health    Make or cancel appointments    Discuss your medicines    Learn about your test results    Speak to your doctor            Additional Information About Your Visit        Care EveryWhere ID     This is your Care EveryWhere ID. This could be used by other organizations to access your [place] medical records  [id number]        Your Vitals Were     Pulse Height Last Period Pulse Oximetry Breastfeeding? BMI (Body Mass Index)    84 1.549 m (5' 1\") 06/21/2018 99% No 18.89 kg/m2       Blood Pressure from Last 3 Encounters:   07/09/18 110/73   05/17/18 129/82   05/08/18 115/82    Weight from Last 3 Encounters:   07/09/18 45.4 kg (100 lb)   05/17/18 44.9 kg (99 lb)   05/08/18 45.4 kg (100 lb)              Today, you had the following     No orders found for display       Primary Care Provider    None Specified       No primary provider on file.        Equal Access to Services     ROXANNE GAMEZ : Hadii abelino lewiso Sodanielle, waaxda luqadaha, qaybta kaalmada adeegyada, shadi lopez . So [facility] [phone].    ATENCIÓN: Si habla español, tiene a lynn disposición servicios gratuitos de asistencia lingüística. Llame al [phone].    We comply with applicable federal civil rights laws and [place] laws. We do not discriminate on the basis of race, color, national origin, age, disability, sex, sexual orientation, or gender identity.            Thank you!     Thank you for choosing " [facility]  for your care. Our goal is always to provide you with excellent care. Hearing back from our patients is one way we can continue to improve our services. Please take a few minutes to complete the written survey that you may receive in the mail after your visit with us. Thank you!             Your Updated Medication List - Protect others around you: Learn how to safely use, store and throw away your medicines at www.disposemymeds.org.      Notice  As of 7/9/2018 10:31 AM    You have not been prescribed any medications.

## 2018-07-31 ENCOUNTER — OFFICE VISIT (OUTPATIENT)
Dept: FAMILY MEDICINE | Facility: CLINIC | Age: 18
End: 2018-07-31
Payer: COMMERCIAL

## 2018-07-31 VITALS — SYSTOLIC BLOOD PRESSURE: 108 MMHG | DIASTOLIC BLOOD PRESSURE: 78 MMHG | HEART RATE: 79 BPM

## 2018-07-31 DIAGNOSIS — S06.0X0D CONCUSSION WITHOUT LOSS OF CONSCIOUSNESS, SUBSEQUENT ENCOUNTER: Primary | ICD-10-CM

## 2018-07-31 NOTE — MR AVS SNAPSHOT
After Visit Summary   7/31/2018    Fred Odom    MRN: [id number]           Patient Information     Date Of Birth          2000        Visit Information        Provider Department      7/31/2018 9:15 AM Radha Gorman MD [facility]        Today's Diagnoses     Concussion without loss of consciousness, subsequent encounter    -  1       Follow-ups after your visit        Who to contact     Please call your clinic at [phone] to:    Ask questions about your health    Make or cancel appointments    Discuss your medicines    Learn about your test results    Speak to your doctor            Additional Information About Your Visit        Care EveryWhere ID     This is your Care EveryWhere ID. This could be used by other organizations to access your [employer] medical records  [id number]        Your Vitals Were     Pulse Last Period                79 07/25/2018 (Approximate)           Blood Pressure from Last 3 Encounters:   08/21/18 105/76   07/31/18 108/78   07/09/18 110/73    Weight from Last 3 Encounters:   08/21/18 45.7 kg (100 lb 12.8 oz)   07/09/18 45.4 kg (100 lb)   05/17/18 44.9 kg (99 lb)              Today, you had the following     No orders found for display       Primary Care Provider    None Specified       No primary provider on file.        Equal Access to Services     ROXANNE GAMEZ : Guillaume Chao, eli guerra, mahesh shah, shadi moreno. So [facility] [phone].    ATENCIÓN: Si habla español, tiene a lynn disposición servicios gratuitos de asistencia lingüística. Llame al [phone].    We comply with applicable federal civil rights laws and [place] laws. We do not discriminate on the basis of race, color, national origin, age, disability, sex, sexual orientation, or gender identity.            Thank you!     Thank you for choosing [facility]  for your care. Our  goal is always to provide you with excellent care. Hearing back from our patients is one way we can continue to improve our services. Please take a few minutes to complete the written survey that you may receive in the mail after your visit with us. Thank you!             Your Updated Medication List - Protect others around you: Learn how to safely use, store and throw away your medicines at www.disposemymeds.org.      Notice  As of 7/31/2018 11:59 PM    You have not been prescribed any medications.

## 2018-07-31 NOTE — LETTER
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  She was about to be fully cleared when she sustained another concussion on June 24. She tried light exercise bike and had symptoms so did not want to continue that.  She has seen Dr. Sarthak Pickard and was found to need a new prescription and eye rehab due to constricted visual fields and convergence fatigue.  She is overall feeling better and is having some symptom free days now. Sleeping well.  HAs are decreasing in frequency and intensity.       -O: NAD  /78  Pulse 79        VOMS:    NPC:  tip of nose  Saccades:  Normal without symptoms  VOR:Horizontal and Vertical: No symptoms   Thumb tracking; normal         A:  Concussion  Neurovestibular dysfunction  RTL Level 5:  but not in classes this summer.   Ok for more biking  and harder if tolerating well. .  Continue with Dr. Sarthak Pickard, Behavioral Optometrist.   RTC in approx 3-4 weeks  The patient was managed according to the  Athletic Medicine Concussion Management Protocol.        Monica Ch ATC was present for the entire appointment.            Radha Gorman MD, CAQ, FACSM, CCD  [facility]  Sports Medicine and Bone Health  Team Physician;  Athletics      Radha Gorman MD

## 2018-07-31 NOTE — LETTER
Date:August 29, 2018      Patient was self referred, no letter generated. Do not send.        [facility] Health Information

## 2018-08-01 NOTE — PROGRESS NOTES
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  She was about to be fully cleared when she sustained another concussion on June 24. She tried light exercise bike and had symptoms so did not want to continue that.  She has seen Dr. Sarthak Pickard and was found to need a new prescription and eye rehab due to constricted visual fields and convergence fatigue.  She is overall feeling better and is having some symptom free days now. Sleeping well.  HAs are decreasing in frequency and intensity.       -O: NAD  /78  Pulse 79        VOMS:    NPC:  tip of nose  Saccades:  Normal without symptoms  VOR:Horizontal and Vertical: No symptoms   Thumb tracking; normal         A:  Concussion  Neurovestibular dysfunction  RTL Level 5:  but not in classes this summer.   Ok for more biking  and harder if tolerating well. .  Continue with Dr. Sarthak Pickard, Behavioral Optometrist.   RTC in approx 3-4 weeks  The patient was managed according to the  Athletic Medicine Concussion Management Protocol.        Monica Ch ATC was present for the entire appointment.            Radha Gorman MD, CAQ, FACSM, CCD  [facility]  Sports Medicine and Bone Health  Team Physician;  Athletics

## 2018-08-21 ENCOUNTER — OFFICE VISIT (OUTPATIENT)
Dept: FAMILY MEDICINE | Facility: CLINIC | Age: 18
End: 2018-08-21
Payer: COMMERCIAL

## 2018-08-21 VITALS
SYSTOLIC BLOOD PRESSURE: 105 MMHG | BODY MASS INDEX: 17.21 KG/M2 | HEART RATE: 91 BPM | WEIGHT: 100.8 LBS | DIASTOLIC BLOOD PRESSURE: 76 MMHG | HEIGHT: 64 IN

## 2018-08-21 DIAGNOSIS — S06.0X0D CONCUSSION WITHOUT LOSS OF CONSCIOUSNESS, SUBSEQUENT ENCOUNTER: Primary | ICD-10-CM

## 2018-08-21 NOTE — LETTER
Date:September 4, 2018      Patient was self referred, no letter generated. Do not send.        [facility] Health Information

## 2018-08-21 NOTE — LETTER
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     She saw Sarthak Pickard 1 week ago who gave her eye exercises and new lenses. She has been using these techniques and has noted benefit. She goes back for follow up next week.     In the past week she has been feeling about 90% better with the exception of this past Friday. 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 She reports feeling very dizzy and nauseous throughout the day. She rested Saturday and Sunday and has been feeling much better. She did bike for 8 minutes yesterday which was tolerated without issue.     O: NAD  Ht 1.626 m (5' 4\")  Wt 45.7 kg (100 lb 12.8 oz)  LMP 07/25/2018 (Approximate)  BMI 17.3 kg/m2   VOMs  -VOR (horizontal and vertical) and VMS testing did not provoke symptoms.     A:  Concussion with Neurovestibular dysfunction    -May increase physical activity. Take return to play slower (2 days at activity before advancing, instead of 1)  -F/u with Dr. Sarthak Pickard, Behavioral Optometrist as scheduled next week  -The patient was managed according to the  Athletic Medicine Concussion Management Protocol.      Dr. Gorman and Chacha Houston ATC were present for the entire appointment.      Marilu Muhammad MD  Primary Care Sports Medicine Fellow              Attending Note:   I have personally examined this patient and have reviewed the clinical presentation and progress note with the fellow. I agree with the treatment plan as outlined. The plan was formulated with the fellow on the day of the patient's visit.    Radha Gorman MD, CAQ, CCD  [facility]  Sports Medicine and Bone Health      Radha Gorman MD    "

## 2018-08-21 NOTE — PROGRESS NOTES
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     She saw Sarthka Pickard 1 week ago who gave her eye exercises and new lenses. She has been using these techniques and has noted benefit. She goes back for follow up next week.     In the past week she has been feeling about 90% better with the exception of this past Friday. 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 She reports feeling very dizzy and nauseous throughout the day. She rested Saturday and Sunday and has been feeling much better. She did bike for 8 minutes yesterday which was tolerated without issue.     O: NAD  Ht 1.626 m (5' 4\")  Wt 45.7 kg (100 lb 12.8 oz)  LMP 07/25/2018 (Approximate)  BMI 17.3 kg/m2   VOMs  -VOR (horizontal and vertical) and VMS testing did not provoke symptoms.     A:  Concussion with Neurovestibular dysfunction    -May increase physical activity. Take return to play slower (2 days at activity before advancing, instead of 1)  -F/u with Dr. Sarthak Pickard, Behavioral Optometrist as scheduled next week  -The patient was managed according to the  Athletic Medicine Concussion Management Protocol.      Dr. Gorman and Chacha Houston ATC were present for the entire appointment.      Marilu Muhammad MD  Primary Care Sports Medicine Fellow            "

## 2018-08-21 NOTE — MR AVS SNAPSHOT
"              After Visit Summary   8/21/2018    Fred Odom    MRN: [id number]           Patient Information     Date Of Birth          2000        Visit Information        Provider Department      8/21/2018 9:30 AM Radha Gorman MD [facility]        Today's Diagnoses     Concussion without loss of consciousness, subsequent encounter    -  1       Follow-ups after your visit        Who to contact     Please call your clinic at [phone] to:    Ask questions about your health    Make or cancel appointments    Discuss your medicines    Learn about your test results    Speak to your doctor            Additional Information About Your Visit        Care EveryWhere ID     This is your Care EveryWhere ID. This could be used by other organizations to access your [place] medical records  [id number]        Your Vitals Were     Pulse Height Last Period BMI (Body Mass Index)          91 1.626 m (5' 4\") 07/25/2018 (Approximate) 17.3 kg/m2         Blood Pressure from Last 3 Encounters:   08/21/18 105/76   07/31/18 108/78   07/09/18 110/73    Weight from Last 3 Encounters:   08/21/18 45.7 kg (100 lb 12.8 oz)   07/09/18 45.4 kg (100 lb)   05/17/18 44.9 kg (99 lb)              Today, you had the following     No orders found for display       Primary Care Provider    None Specified       No primary provider on file.        Equal Access to Services     [facility] : Hadii abelino leiwso Sodanielle, waaxda luqadaha, qaybta kaalmada adeegyada, shadi lopez . So [facility] [phone].    ATENCIÓN: Si habla español, tiene a lynn disposición servicios gratuitos de asistencia lingüística. Llame al [phone].    We comply with applicable federal civil rights laws and [place] laws. We do not discriminate on the basis of race, color, national origin, age, disability, sex, sexual orientation, or gender identity.            Thank you!     Thank you for choosing [facility]  for your care. Our goal is always to provide you with excellent care. Hearing back from our patients is one way we can continue to improve our services. Please take a few minutes to complete the written survey that you may receive in the mail after your visit with us. Thank you!             Your Updated Medication List - Protect others around you: Learn how to safely use, store and throw away your medicines at www.disposemymeds.org.      Notice  As of 8/21/2018 11:59 PM    You have not been prescribed any medications.

## 2018-09-01 NOTE — PROGRESS NOTES
Attending Note:   I have personally examined this patient and have reviewed the clinical presentation and progress note with the fellow. I agree with the treatment plan as outlined. The plan was formulated with the fellow on the day of the patient's visit.    Radha Gorman MD, CAQ, CCD  [facility]  Sports Medicine and Bone Health

## 2018-09-18 ENCOUNTER — OFFICE VISIT (OUTPATIENT)
Dept: FAMILY MEDICINE | Facility: CLINIC | Age: 18
End: 2018-09-18
Payer: COMMERCIAL

## 2018-09-18 VITALS
WEIGHT: 100.6 LBS | DIASTOLIC BLOOD PRESSURE: 78 MMHG | HEART RATE: 111 BPM | BODY MASS INDEX: 17.17 KG/M2 | SYSTOLIC BLOOD PRESSURE: 112 MMHG | HEIGHT: 64 IN

## 2018-09-18 DIAGNOSIS — S06.0X0D CONCUSSION WITHOUT LOSS OF CONSCIOUSNESS, SUBSEQUENT ENCOUNTER: Primary | ICD-10-CM

## 2018-09-18 NOTE — MR AVS SNAPSHOT
"              After Visit Summary   2018    Fred Odom    MRN: [id number]           Patient Information     Date Of Birth          2000        Visit Information        Provider Department      2018 10:30 AM Radha Gorman MD [facility]        Today's Diagnoses     Concussion without loss of consciousness, subsequent encounter    -  1       Follow-ups after your visit        Who to contact     Please call your clinic at [phone] to:    Ask questions about your health    Make or cancel appointments    Discuss your medicines    Learn about your test results    Speak to your doctor            Additional Information About Your Visit        MyChart Information     [employer] is an electronic gateway that provides easy, online access to your medical records. With Mofang, you can request a clinic appointment, read your test results, renew a prescription or communicate with your care team.     To sign up for [employer] visit the website at [email or web address]   You will be asked to enter the access code listed below, as well as some personal information. Please follow the directions to create your username and password.     Your access code is: [email or web address]  Expires: 2018 11:14 AM     Your access code will  in 90 days. If you need help or a new code, please contact your [facility] or call [phone] for assistance.        Care EveryWhere ID     This is your Care EveryWhere ID. This could be used by other organizations to access your [place] medical records  [id number]        Your Vitals Were     Pulse Height Last Period BMI (Body Mass Index)          111 1.626 m (5' 4\") 2018 17.27 kg/m2         Blood Pressure from Last 3 Encounters:   18 112/78   18 105/76   18 108/78    Weight from Last 3 Encounters:   18 45.6 kg (100 lb 9.6 oz)   18 45.7 kg (100 lb 12.8 oz)   18 45.4 kg (100 " lb)              Today, you had the following     No orders found for display       Primary Care Provider    None Specified       No primary provider on file.        Equal Access to Services     ROXANNE GAMEZ : Guillaume Chao, eli guerra, mariobraulio stilesconcepciondeena shah, shadi emmanuel maria ezohreh larrydavidagustina moreno. So [facility] [phone].    ATENCIÓN: Si habla español, tiene a lynn disposición servicios gratuitos de asistencia lingüística. Llame al [phone].    We comply with applicable federal civil rights laws and [place] laws. We do not discriminate on the basis of race, color, national origin, age, disability, sex, sexual orientation, or gender identity.            Thank you!     Thank you for choosing [facility]  for your care. Our goal is always to provide you with excellent care. Hearing back from our patients is one way we can continue to improve our services. Please take a few minutes to complete the written survey that you may receive in the mail after your visit with us. Thank you!             Your Updated Medication List - Protect others around you: Learn how to safely use, store and throw away your medicines at www.disposemymeds.org.      Notice  As of 9/18/2018 11:14 AM    You have not been prescribed any medications.

## 2018-09-18 NOTE — PROGRESS NOTES
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     She has improved a lot since our last visit.  Able to do school, quizzes, tests without any symptoms.  She is cheering and stunting but not tumbling yet. Hasn't learned back handsprings or back tucks yet before the concussions.     She saw Sarthak Pickard recently who noted that her visual fields are better and changed her contact lens prescription.  No concussion symptoms since before school started.    O: NAD  /78  Pulse 111  Ht 1.626 m (5' 4\")  Wt 45.6 kg (100 lb 9.6 oz)  LMP 08/24/2018  BMI 17.27 kg/m2   No exam performed today.     A:  Concussion with Neurovestibular dysfunction: improved     -Add basic tumbling this week.  If going well, return to trying to return to trying to learn back handsprings.  She hasn't mastered that yet.  If that goes well, RTC for full clearance.    -F/u with Dr. Sarthak Pickard, Behavioral Optometrist as needed  -The patient was managed according to the  Athletic Medicine Concussion Management Protocol.    RTL Level 5    Radha Gorman MD, CAQ, FACSM, CCD  [facility]  Sports Medicine and Bone Health  Team Physician;  Athletics            " Throat Pain/Nasal Jony HPI





- HPI Summary


HPI Summary: 





21 yo female with sore throat and fever since this AM


HA and myalgias


no n/v














- History of Current Complaint


Chief Complaint: UCRespiratory


Stated Complaint: ST/FEVER


Time Seen by Provider: 05/11/18 14:32


Hx Obtained From: Patient


Hx Last Menstrual Period: 4/19/18


Onset/Duration: Gradual Onset


Severity: Severe


Pain Intensity: 8


Pain Scale Used: 0-10 Numeric


Cough: Nonproductive


Associated Signs & Symptoms: Positive: Fever





- Epiglottits Risk Factors


Epiglottis Risk Factors: Negative





- Allergies/Home Medications


Allergies/Adverse Reactions: 


 Allergies











Allergy/AdvReac Type Severity Reaction Status Date / Time


 


No Known Allergies Allergy   Verified 05/11/18 14:30














PMH/Surg Hx/FS Hx/Imm Hx


Previously Healthy: Yes





- Surgical History


Surgical History: None





- Family History


Known Family History: Positive: Unknown - Pt doen't know any FMHX





- Social History


Alcohol Use: Occasionally


Substance Use Type: None


Smoking Status (MU): Never Smoked Tobacco


Have You Smoked in the Last Year: No





Review of Systems


Constitutional: Fever, Chills


Skin: Negative


Eyes: Negative


ENT: Sore Throat


Respiratory: Negative


Cardiovascular: Negative


Gastrointestinal: Negative


Genitourinary: Negative


Motor: Negative


Neurovascular: Negative


Musculoskeletal: Myalgia


Neurological: Headache


Psychological: Negative


Is Patient Immunocompromised?: No


All Other Systems Reviewed And Are Negative: Yes





Physical Exam


Triage Information Reviewed: Yes


Appearance: Well-Appearing, No Pain Distress, Well-Nourished


Vital Signs: 


 Initial Vital Signs











Temp  99.7 F   05/11/18 14:30


 


Pulse  83   05/11/18 14:30


 


Resp  16   05/11/18 14:30


 


BP  106/49   05/11/18 14:30


 


Pulse Ox  100   05/11/18 14:30











Eye Exam: Normal


Eyes: Positive: Conjunctiva Clear


ENT: Positive: Pharyngeal erythema, Tonsillar swelling, Tonsillar exudate, 

Uvula midline


Neck: Positive: Supple, Enlarged Nodes @ - ant cervical


Respiratory: Positive: Lungs clear, Normal breath sounds, No respiratory 

distress


Cardiovascular: Positive: RRR, No Murmur


Musculoskeletal: Positive: ROM Intact, No Edema


Neurological: Positive: Alert


Psychological Exam: Normal


Skin Exam: Normal





Diagnostics





- Laboratory


Diagnostic Studies Completed/Ordered: acute exudative tonsillitis





Throat Pain/Nasal Course/Dx





- Course


Assessment/Plan: strep (-)





- Differential Dx/Diagnosis


Provider Diagnoses: exudative tonsillitis





Discharge





- Sign-Out/Discharge


Documenting (check all that apply): Discharge/Admit/Transfer





- Discharge Plan


Condition: Stable


Disposition: HOME


Prescriptions: 


Cephalexin CAP* [Keflex CAP*] 500 mg PO BID #20 cap


Patient Education Materials:  Tonsillitis (ED)


Referrals: 


Non Staff,Doctor [Primary Care Provider] - 


Additional Instructions: 


recheck for worsening symptoms





recheck in 4 days if not better





trylenol or adivl for pain











- Billing Disposition and Condition


Condition: STABLE


Disposition: HOME

## 2018-09-18 NOTE — LETTER
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     She has improved a lot since our last visit.  Able to do school, quizzes, tests without any symptoms.  She is cheering and stunting but not tumbling yet. Hasn't learned back handsprings or back tucks yet before the concussions.     She saw Sarthak Pickard recently who noted that her visual fields are better and changed her contact lens prescription.  No concussion symptoms since before school started.    O: NAD  /78  Pulse 111  Ht 1.626 m (5' 4\")  Wt 45.6 kg (100 lb 9.6 oz)  LMP 08/24/2018  BMI 17.27 kg/m2   No exam performed today.     A:  Concussion with Neurovestibular dysfunction: improved     -Add basic tumbling this week.  If going well, return to trying to return to trying to learn back handsprings.  She hasn't mastered that yet.  If that goes well, RTC for full clearance.    -F/u with Dr. Sarthak Pickard, Behavioral Optometrist as needed  -The patient was managed according to the  Athletic Medicine Concussion Management Protocol.    RTL Level 5    Radha Gorman MD, CAQ, FACSM, CCD  [facility]  Sports Medicine and Bone Health  Team Physician;  Athletics              Radha Gorman MD    "

## 2018-09-18 NOTE — LETTER
Date:September 19, 2018      Patient was self referred, no letter generated. Do not send.        [facility] Information

## 2018-11-13 ENCOUNTER — OFFICE VISIT (OUTPATIENT)
Dept: FAMILY MEDICINE | Facility: CLINIC | Age: 18
End: 2018-11-13
Payer: COMMERCIAL

## 2018-11-13 VITALS
HEART RATE: 84 BPM | BODY MASS INDEX: 17.17 KG/M2 | DIASTOLIC BLOOD PRESSURE: 73 MMHG | HEIGHT: 64 IN | SYSTOLIC BLOOD PRESSURE: 109 MMHG | WEIGHT: 100.6 LBS

## 2018-11-13 DIAGNOSIS — S06.0X0D CONCUSSION WITHOUT LOSS OF CONSCIOUSNESS, SUBSEQUENT ENCOUNTER: Primary | ICD-10-CM

## 2018-11-13 NOTE — MR AVS SNAPSHOT
"              After Visit Summary   2018    Fred Odom    MRN: [id number]           Patient Information     Date Of Birth          2000        Visit Information        Provider Department      2018 10:45 AM Radha Gorman MD [facility]        Today's Diagnoses     Concussion without loss of consciousness, subsequent encounter    -  1       Follow-ups after your visit        Who to contact     Please call your clinic at [phone] to:    Ask questions about your health    Make or cancel appointments    Discuss your medicines    Learn about your test results    Speak to your doctor            Additional Information About Your Visit        MyChart Information     [employer] is an electronic gateway that provides easy, online access to your medical records. With [employer], you can request a clinic appointment, read your test results, renew a prescription or communicate with your care team.     To sign up for [employer] visit the website at [email or web address]   You will be asked to enter the access code listed below, as well as some personal information. Please follow the directions to create your username and password.     Your access code is: [email or web address]  Expires: 2018 10:14 AM     Your access code will  in 90 days. If you need help or a new code, please contact your [facility] or call [phone] for assistance.        Care EveryWhere ID     This is your Care EveryWhere ID. This could be used by other organizations to access your [facility] medical records  [id number]        Your Vitals Were     Pulse Height Last Period Breastfeeding? BMI (Body Mass Index)       84 5' 4\" (1.626 m) 10/07/2018 (Exact Date) No 17.27 kg/m2        Blood Pressure from Last 3 Encounters:   18 109/73   18 112/78   18 105/76    Weight from Last 3 Encounters:   18 100 lb 9.6 oz (45.6 kg)   18 100 lb 9.6 oz (45.6 " kg)   08/21/18 100 lb 12.8 oz (45.7 kg)              Today, you had the following     No orders found for display       Primary Care Provider    None Specified       No primary provider on file.        Equal Access to Services     ROXANNE GAMEZ : Hadii aad ku hadkseniatico Chao, gabrielladeena castillodee deeha, mahesh shah, shadi mandyin hayaazohreh larrydavidagustina moreno. So [facility] [phone].    ATENCIÓN: Si habla español, tiene a lynn disposición servicios gratuitos de asistencia lingüística. Llame al [phone].    We comply with applicable federal civil rights laws and [place] laws. We do not discriminate on the basis of race, color, national origin, age, disability, sex, sexual orientation, or gender identity.            Thank you!     Thank you for choosing [facility]  for your care. Our goal is always to provide you with excellent care. Hearing back from our patients is one way we can continue to improve our services. Please take a few minutes to complete the written survey that you may receive in the mail after your visit with us. Thank you!             Your Updated Medication List - Protect others around you: Learn how to safely use, store and throw away your medicines at www.disposemymeds.org.      Notice  As of 11/13/2018 11:07 AM    You have not been prescribed any medications.

## 2018-11-13 NOTE — LETTER
Date:November 14, 2018      Patient was self referred, no letter generated. Do not send.        [facility] Information

## 2018-11-13 NOTE — LETTER
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     Interval Hx:    -No symptoms with cheerleading  -Doing well since September at our last visit.  -Did ImPACT test in September  -Dr. Sarthak Pickard has discharged.  -School is going well without any difficulties  -She has been a RTL Level 5 since September  -She is hoping to be fully cleared.       O: NAD  /73  Pulse 84  Ht 5' 4\" (1.626 m)  Wt 100 lb 9.6 oz (45.6 kg)  LMP 10/07/2018 (Exact Date)  Breastfeeding? No  BMI 17.27 kg/m2  VOR:  normal    A:  Concussion with Neurovestibular dysfunction: resolved    -Full clearance  -The patient was managed according to the  Athletic Medicine Concussion Management Protocol.    RTL Level 5      Marilu Muhammad MD was present for the entire appointment.   Radha Gorman MD, CAQ, FACSM, CCD  [facility]  Sports Medicine and Bone Health  Team Physician;  Athletics              Radha Gorman MD    "

## 2018-11-13 NOTE — PROGRESS NOTES
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     Interval Hx:    -No symptoms with cheerleading  -Doing well since September at our last visit.  -Did ImPACT test in September  -Dr. Sarthak Pickard has discharged.  -School is going well without any difficulties  -She has been a RTL Level 5 since September  -She is hoping to be fully cleared.       O: NAD  /73  Pulse 84  Ht 5' 4\" (1.626 m)  Wt 100 lb 9.6 oz (45.6 kg)  LMP 10/07/2018 (Exact Date)  Breastfeeding? No  BMI 17.27 kg/m2  VOR:  normal    A:  Concussion with Neurovestibular dysfunction: resolved    -Full clearance  -The patient was managed according to the  Athletic Medicine Concussion Management Protocol.    RTL Level 5      Marilu Muhammad MD was present for the entire appointment.   Radha Gorman MD, CAQ, FACSM, CCD  [facility]  Sports Medicine and Bone Health  Team Physician;  Athletics            "

## 2018-12-11 ENCOUNTER — OFFICE VISIT (OUTPATIENT)
Dept: FAMILY MEDICINE | Facility: CLINIC | Age: 18
End: 2018-12-11
Payer: COMMERCIAL

## 2018-12-11 VITALS
HEART RATE: 71 BPM | HEIGHT: 64 IN | DIASTOLIC BLOOD PRESSURE: 78 MMHG | SYSTOLIC BLOOD PRESSURE: 115 MMHG | BODY MASS INDEX: 16.22 KG/M2 | WEIGHT: 95 LBS

## 2018-12-11 DIAGNOSIS — G44.89 OTHER HEADACHE SYNDROME: Primary | ICD-10-CM

## 2018-12-11 DIAGNOSIS — R42 DIZZY: ICD-10-CM

## 2018-12-11 ASSESSMENT — MIFFLIN-ST. JEOR: SCORE: 1186.17

## 2018-12-11 NOTE — PROGRESS NOTES
"  S: Duane returns to clinic to discuss an increase in her visual symptoms and HAs similar to what she experienced with her concussions. She has not had a new head injury since we fully cleared her in mid November.  She had been doing well and participating in full cheerleading activities. She noticed that she would develop a frontal HA and eye strain with prolonged studying and writing papers. She was studying a lot due to the end of the semester and finals.  She felt like it was difficult for her to concentrate.  Today she feels well and doesn't have symptoms. She has not experienced any symptoms or problems with cheerleading or exercising.     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  I have recommended a return visit to Dr. Sarthak Bernard, Behavioral Optometrist.  Manual therapy and modalites with ATC staff targeting her cervical myofascial pain. I think some of her symptoms will improve with the completion of the semester. Regarding cheerleading, I have recommended that she avoid tumbling and stunting at this time due to the risk of experiencing dizziness and injuring herself.  RTC in early Jan.     Asif Livingston ATC was present for the entire appt.     > 25 min of total time spent in one-on-one evalution and discussion with patient regarding nature of problem, course, prior treatments, and therapeutic options,> 50% of which was spent in counseling and coordination of " care:    Radha Gorman MD, CAQ, FACSM, CCD  [facility]  Sports Medicine and Bone Health  Team Physician;  Athletics                                    1. Return to see Dr. Sarthak Pickard  2. Melatonin:  Take early for the next three days and if not helping, consider ami  3. Ok to cheer but no tumbling or stunting  4. RTL Level 5  5. RTC in 3 weeks  6. Rehab on cervical myofascial pain with ATC        Radha Gorman MD, CAQ, FACSM, CCD  [facility]  Sports Medicine and Bone Health  Team Physician;  Athletics

## 2018-12-11 NOTE — LETTER
Date:December 31, 2018      Patient was self referred, no letter generated. Do not send.        [facility] Health Information

## 2018-12-11 NOTE — LETTER
"  12/11/2018      RE: Fred Odom  [address] [place] [id number]         S: Duane returns to clinic to discuss an increase in her visual symptoms and HAs similar to what she experienced with her concussions. She has not had a new head injury since we fully cleared her in mid November.  She had been doing well and participating in full cheerleading activities. She noticed that she would develop a frontal HA and eye strain with prolonged studying and writing papers. She was studying a lot due to the end of the semester and finals.  She felt like it was difficult for her to concentrate.  Today she feels well and doesn't have symptoms. She has not experienced any symptoms or problems with cheerleading or exercising.     O:  NAD  /78   Pulse 71   Ht 1.626 m (5' 4.02\")   Wt 43.1 kg (95 lb)   BMI 16.30 kg/m     HEENT: neg  Neck; Tightness over the bilateral traps in the suboccipital area, LS  FROM  VOMS testing:    EOMI without nystagmus  Normal NPC but L eye convergence fatigue noted with repeated trials  Saccades:  Normal with vertical and horizontal  VOR:  HA and dizziness with horizontal    A:  -Convergence insufficiency and VOR dysfunction with intense studying  -Cervical myofascial pain  -No evidence of a new concussion    P:  We have discussed her symptoms and exam findings in detail.  I have recommended a return visit to Dr. Sarthak Bernard, Behavioral Optometrist.  Manual therapy and modalites with ATC staff targeting her cervical myofascial pain. I think some of her symptoms will improve with the completion of the semester. Regarding cheerleading, I have recommended that she avoid tumbling and stunting at this time due to the risk of experiencing dizziness and injuring herself.  RTC in early Jan.     Asif Livingston ATC was present for the entire appt.     > 25 min of total time spent in one-on-one evalution and discussion with patient regarding nature of problem, course, prior treatments, and " therapeutic options,> 50% of which was spent in counseling and coordination of care:    Radha Gorman MD, CAQ, FACSM, CCD  [facility]  Sports Medicine and Bone Health  Team Physician;  Athletics                                    1. Return to see Dr. Sarthak Pickard  2. Melatonin:  Take early for the next three days and if not helping, consider ami  3. Ok to cheer but no tumbling or stunting  4. RTL Level 5  5. RTC in 3 weeks  6. Rehab on cervical myofascial pain with ATC        Radha Gorman MD, CAQ, FACSM, CCD  [facility]  Sports Medicine and Bone Health  Team Physician;  Athletics      Radha Gorman MD

## 2020-01-16 ENCOUNTER — TELEPHONE (OUTPATIENT)
Dept: FAMILY MEDICINE | Facility: CLINIC | Age: 20
End: 2020-01-16

## 2020-01-16 NOTE — TELEPHONE ENCOUNTER
Spoke with patient and she hasn't been seen for over a year, the last note she was not cleared to return. An appointment was made for a routine follow up.

## 2020-01-16 NOTE — TELEPHONE ENCOUNTER
DIAGNOSIS: follow up on concussion, looking for clearance   APPOINTMENT DATE: 1/30   NOTES STATUS DETAILS   OFFICE NOTE from referring provider N/A    OFFICE NOTE from other specialist Internal nathen   DISCHARGE SUMMARY from hospital N/A    DISCHARGE REPORT from the ER N/A    OPERATIVE REPORT Internal    MEDICATION LIST N/A    MRI N/A    CT SCAN N/A    XRAYS (IMAGES & REPORTS) N/A

## 2020-01-16 NOTE — TELEPHONE ENCOUNTER
"[facility] Call Center    Phone Message    May a detailed message be left on voicemail: yes    Reason for Call: Patient calling back stating that she is needing academic petition form completed prior to her visit on 1/30/2020. Writer informed caller per note below from Luke Lorenz that she has not been seen in over a year and last note was not cleared for a return, but patient stating that this specific form is an \"academic petition form\" and not a \"medical clearance form.\" Patient stating she sent form directly to Dr. Gorman's personal email and is requesting the completed form to be sent back to her email at: [email or web address]   Caller requesting call back with any further questions. Please advise.  Thank you, Ludy Otoole on 1/16/2020 at 10:32 AM     Action Taken: Message routed to:  [facility] Sports Med  "

## 2020-01-16 NOTE — TELEPHONE ENCOUNTER
[facility] Call Center    Phone Message    May a detailed message be left on voicemail: yes    Reason for Call: Other: Pt would like a form filled out stating she is in the clear from her concussion and said she can either pick it up at the clinic or have it mailed out. She would like a call back to discuss the form . Please call pt regarding form     Action Taken: Message routed to:  [facility] Sports

## 2020-01-30 ENCOUNTER — PRE VISIT (OUTPATIENT)
Dept: ORTHOPEDICS | Facility: CLINIC | Age: 20
End: 2020-01-30

## 2020-01-30 ENCOUNTER — OFFICE VISIT (OUTPATIENT)
Dept: ORTHOPEDICS | Facility: CLINIC | Age: 20
End: 2020-01-30
Payer: COMMERCIAL

## 2020-01-30 VITALS — WEIGHT: 103 LBS | RESPIRATION RATE: 16 BRPM | HEIGHT: 63 IN | BODY MASS INDEX: 18.25 KG/M2

## 2020-01-30 DIAGNOSIS — S06.0X0D CONCUSSION WITHOUT LOSS OF CONSCIOUSNESS, SUBSEQUENT ENCOUNTER: Primary | ICD-10-CM

## 2020-01-30 ASSESSMENT — MIFFLIN-ST. JEOR: SCORE: 1201.33

## 2020-01-30 NOTE — PROGRESS NOTES
SUBJECTIVE:  Fred Odom is a 19 year old female who is seen for follow up of prior concussions in 2018 and final clearance. No new head injuries.     Was asymptomatic for all of 2019 until finals week, then she had one of sensitivity to light, headache, and fogginess that was reminiscent of her concussion sx. This occurred while studying in the library. Recalls eating and drinking normally.    Corrective lenses updated with Dr. Pickard Dec 2018. Also notes more regular difficulty with vision and night time driving.     Sleeps well, usually 7-8hrs/night.   Works out 2-3x/wk now, feels good during and after exercise.     Mechanism of injury: fall  Symptoms at this visit: None today    Patient Supplied Answers To Sports Med Concussion Questionnaire:   Sports Medicine Concussion 1/30/2020   School? [employer]   Grade? senior   When did your injury occur? 3/29/2018   Date questionaire completed? 4/1/2018   How did your injury occur? Cheerleading fall   Immediate symptoms of concussion? No loss of consciousness, no headache, light sensitivity, blurred vision   Symptoms at this visit: headache? 0   Symptoms at this visit: nausea? 0   Symptoms at this visit: balance problems? 0   Symptoms at this visit: dizziness? 0   Symptoms at this visit: fatigue? 0   Symptoms at this visit: sensitivity to light? 0   Symptoms at this visit: sensitivity to noise? 0   Symptoms at this visit: irritability? 0   Symptoms at this visit: feeling mentally foggy? 0   Symptoms at this visit: difficulty concentrating? 0   Symptoms at this visit: sleep? No issues      Past pertinent history: Migraines: no     Depression: no     Anxiety: no     Learning disability: no     ADHD: no     Past History of concussions: No    Previously had called in about a form requiring MD signature but reports no longer needing this.     Patient's past medical, surgical, social and family histories reviewed:  No significant medical  "history    REVIEW OF SYSTEMS:  CONSTITUTIONAL:NEGATIVE for fever, chills, change in weight  INTEGUMENTARY/SKIN: NEGATIVE for worrisome rashes, moles or lesions  MUSCULOSKELETAL:See HPI above  NEURO: NEGATIVE for weakness, dizziness or paresthesias    Resp 16   Ht 1.6 m (5' 3\")   Wt 46.7 kg (103 lb)   BMI 18.25 kg/m      EXAM:  GENERAL APPEARANCE: healthy, alert and no distress   SKIN: no suspicious lesions or rashes  PSYCH:  mentation appears normal and affect normal/bright    HEENT:  External Ear Canal:Normal  Reflexes: Normal  NECK:  supple, non-tender, FULL ROM    Neurologic:  Strength:  Shoulder shrug (C5):5/5  Deltoid (C5): 5/5  Bicep (C6):5/5  Wrist Extension (C6): 5/5  Tricep (C7):5/5  Wrist Flexion (C7): 5/5  Finger Flexion (C8/T1):5/5    sensory exam grossly normal, mentation intact and speech normal    Cranial Nerves 2-12:  intact  TASHIA:Yes  EOMI:Yes  Nystagmus: No  Coordination:  Finger to Nose: normal    Balance Testing:       Forward Tandem: normal  Convergence Testing: Normal (</= 6 cm)    VOMS wnl, no increased sx reported       GAIT: wnl      ASSESSMENT/PLAN  Concussion. Complete resolution of concussion sx, likely for 1 year now but had not been officially reevaluated. Single episode of sx during finals last semester very unlikely to be related. Recommend follow up with eye doctor re nighttime driving/vision concerns. Encouraged continued physical activity. Follow up PRN.     Seen and discussed with Dr. Gorman.     Jad Moeller MD  Primary Care Sports Medicine Fellow   "

## 2020-01-30 NOTE — LETTER
Date:February 11, 2020      Patient was self referred, no letter generated. Do not send.        [facility] Information

## 2020-01-30 NOTE — LETTER
1/30/2020      RE: Fred Odom  [address] [place] [id number]       SUBJECTIVE:  Fred Odom is a 19 year old female who is seen for follow up of prior concussions in 2018 and final clearance. No new head injuries.     Was asymptomatic for all of 2019 until finals week, then she had one of sensitivity to light, headache, and fogginess that was reminiscent of her concussion sx. This occurred while studying in the library. Recalls eating and drinking normally.    Corrective lenses updated with Dr. Pickard Dec 2018. Also notes more regular difficulty with vision and night time driving.     Sleeps well, usually 7-8hrs/night.   Works out 2-3x/wk now, feels good during and after exercise.     Mechanism of injury: fall  Symptoms at this visit: None today    Patient Supplied Answers To Sports Med Concussion Questionnaire:   Sports Medicine Concussion 1/30/2020   School? [employer]   Grade? senior   When did your injury occur? 3/29/2018   Date questionaire completed? 4/1/2018   How did your injury occur? Cheerleading fall   Immediate symptoms of concussion? No loss of consciousness, no headache, light sensitivity, blurred vision   Symptoms at this visit: headache? 0   Symptoms at this visit: nausea? 0   Symptoms at this visit: balance problems? 0   Symptoms at this visit: dizziness? 0   Symptoms at this visit: fatigue? 0   Symptoms at this visit: sensitivity to light? 0   Symptoms at this visit: sensitivity to noise? 0   Symptoms at this visit: irritability? 0   Symptoms at this visit: feeling mentally foggy? 0   Symptoms at this visit: difficulty concentrating? 0   Symptoms at this visit: sleep? No issues      Past pertinent history: Migraines: no     Depression: no     Anxiety: no     Learning disability: no     ADHD: no     Past History of concussions: No    Previously had called in about a form requiring MD signature but reports no longer needing this.     Patient's past  "medical, surgical, social and family histories reviewed:  No significant medical history    REVIEW OF SYSTEMS:  CONSTITUTIONAL:NEGATIVE for fever, chills, change in weight  INTEGUMENTARY/SKIN: NEGATIVE for worrisome rashes, moles or lesions  MUSCULOSKELETAL:See HPI above  NEURO: NEGATIVE for weakness, dizziness or paresthesias    Resp 16   Ht 1.6 m (5' 3\")   Wt 46.7 kg (103 lb)   BMI 18.25 kg/m       EXAM:  GENERAL APPEARANCE: healthy, alert and no distress   SKIN: no suspicious lesions or rashes  PSYCH:  mentation appears normal and affect normal/bright    HEENT:  External Ear Canal:Normal  Reflexes: Normal  NECK:  supple, non-tender, FULL ROM    Neurologic:  Strength:  Shoulder shrug (C5):5/5  Deltoid (C5): 5/5  Bicep (C6):5/5  Wrist Extension (C6): 5/5  Tricep (C7):5/5  Wrist Flexion (C7): 5/5  Finger Flexion (C8/T1):5/5    sensory exam grossly normal, mentation intact and speech normal    Cranial Nerves 2-12:  intact  TASHIA:Yes  EOMI:Yes  Nystagmus: No  Coordination:  Finger to Nose: normal    Balance Testing:       Forward Tandem: normal  Convergence Testing: Normal (</= 6 cm)    VOMS wnl, no increased sx reported       GAIT: wnl      ASSESSMENT/PLAN  Concussion. Complete resolution of concussion sx, likely for 1 year now but had not been officially reevaluated. Single episode of sx during finals last semester very unlikely to be related. Recommend follow up with eye doctor re nighttime driving/vision concerns. Encouraged continued physical activity. Follow up PRN.     Seen and discussed with Dr. Gorman.     Jad Moeller MD  Primary Care Sports Medicine Fellow     Attending Note:   I have personally examined this patient and have reviewed the clinical presentation and progress note with the fellow. I agree with the treatment plan as outlined. The plan was formulated with the fellow on the day of the patient's visit.  Radha Gorman MD, CAQ, CCD  [facility]  Sports Medicine and Bone " [facility]    Jad Moeller MD

## 2020-02-10 NOTE — PROGRESS NOTES
Attending Note:   I have personally examined this patient and have reviewed the clinical presentation and progress note with the fellow. I agree with the treatment plan as outlined. The plan was formulated with the fellow on the day of the patient's visit.  Radha Gorman MD, CAQ, CCD  [facility]  Sports Medicine and Bone Health

## 2021-08-14 ENCOUNTER — HEALTH MAINTENANCE LETTER (OUTPATIENT)
Age: 21
End: 2021-08-14

## 2021-10-09 ENCOUNTER — HEALTH MAINTENANCE LETTER (OUTPATIENT)
Age: 21
End: 2021-10-09

## 2022-09-11 ENCOUNTER — HEALTH MAINTENANCE LETTER (OUTPATIENT)
Age: 22
End: 2022-09-11

## 2023-10-07 ENCOUNTER — HEALTH MAINTENANCE LETTER (OUTPATIENT)
Age: 23
End: 2023-10-07